# Patient Record
Sex: FEMALE | Race: BLACK OR AFRICAN AMERICAN | Employment: FULL TIME | URBAN - METROPOLITAN AREA
[De-identification: names, ages, dates, MRNs, and addresses within clinical notes are randomized per-mention and may not be internally consistent; named-entity substitution may affect disease eponyms.]

---

## 2017-11-16 ENCOUNTER — HOSPITAL ENCOUNTER (EMERGENCY)
Age: 42
Discharge: HOME | End: 2017-11-16
Attending: EMERGENCY MEDICINE
Payer: SELF-PAY

## 2017-11-16 VITALS
RESPIRATION RATE: 16 BRPM | HEIGHT: 63 IN | HEART RATE: 92 BPM | SYSTOLIC BLOOD PRESSURE: 171 MMHG | TEMPERATURE: 98.5 F | OXYGEN SATURATION: 100 % | BODY MASS INDEX: 48.37 KG/M2 | DIASTOLIC BLOOD PRESSURE: 93 MMHG | WEIGHT: 273 LBS

## 2017-11-16 DIAGNOSIS — I10 ESSENTIAL HYPERTENSION: Primary | ICD-10-CM

## 2017-11-16 DIAGNOSIS — R51.9 NONINTRACTABLE HEADACHE, UNSPECIFIED CHRONICITY PATTERN, UNSPECIFIED HEADACHE TYPE: ICD-10-CM

## 2017-11-16 LAB
ANION GAP SERPL CALC-SCNC: 10 MMOL/L (ref 3–18)
BASOPHILS # BLD: 0 K/UL (ref 0–0.06)
BASOPHILS NFR BLD: 0 % (ref 0–2)
BUN SERPL-MCNC: 6 MG/DL (ref 7–18)
BUN/CREAT SERPL: 8 (ref 12–20)
CALCIUM SERPL-MCNC: 9.5 MG/DL (ref 8.5–10.1)
CHLORIDE SERPL-SCNC: 103 MMOL/L (ref 100–108)
CO2 SERPL-SCNC: 25 MMOL/L (ref 21–32)
CREAT SERPL-MCNC: 0.78 MG/DL (ref 0.6–1.3)
DIFFERENTIAL METHOD BLD: ABNORMAL
EOSINOPHIL # BLD: 0.2 K/UL (ref 0–0.4)
EOSINOPHIL NFR BLD: 3 % (ref 0–5)
ERYTHROCYTE [DISTWIDTH] IN BLOOD BY AUTOMATED COUNT: 15.9 % (ref 11.6–14.5)
GLUCOSE SERPL-MCNC: 105 MG/DL (ref 74–99)
HCT VFR BLD AUTO: 41.1 % (ref 35–45)
HGB BLD-MCNC: 13.5 G/DL (ref 12–16)
LYMPHOCYTES # BLD: 4.3 K/UL (ref 0.9–3.6)
LYMPHOCYTES NFR BLD: 47 % (ref 21–52)
MCH RBC QN AUTO: 25 PG (ref 24–34)
MCHC RBC AUTO-ENTMCNC: 32.8 G/DL (ref 31–37)
MCV RBC AUTO: 76 FL (ref 74–97)
MONOCYTES # BLD: 0.4 K/UL (ref 0.05–1.2)
MONOCYTES NFR BLD: 5 % (ref 3–10)
NEUTS SEG # BLD: 4 K/UL (ref 1.8–8)
NEUTS SEG NFR BLD: 45 % (ref 40–73)
PLATELET # BLD AUTO: 300 K/UL (ref 135–420)
PMV BLD AUTO: 12 FL (ref 9.2–11.8)
POTASSIUM SERPL-SCNC: 4 MMOL/L (ref 3.5–5.5)
RBC # BLD AUTO: 5.41 M/UL (ref 4.2–5.3)
SODIUM SERPL-SCNC: 138 MMOL/L (ref 136–145)
TROPONIN I SERPL-MCNC: <0.02 NG/ML (ref 0–0.04)
WBC # BLD AUTO: 8.9 K/UL (ref 4.6–13.2)

## 2017-11-16 PROCEDURE — 85025 COMPLETE CBC W/AUTO DIFF WBC: CPT

## 2017-11-16 PROCEDURE — 99284 EMERGENCY DEPT VISIT MOD MDM: CPT

## 2017-11-16 PROCEDURE — 84484 ASSAY OF TROPONIN QUANT: CPT

## 2017-11-16 PROCEDURE — 74011250637 HC RX REV CODE- 250/637: Performed by: PHYSICIAN ASSISTANT

## 2017-11-16 PROCEDURE — 93005 ELECTROCARDIOGRAM TRACING: CPT

## 2017-11-16 PROCEDURE — 80048 BASIC METABOLIC PNL TOTAL CA: CPT

## 2017-11-16 RX ORDER — HYDROCHLOROTHIAZIDE 25 MG/1
25 TABLET ORAL
Status: COMPLETED | OUTPATIENT
Start: 2017-11-16 | End: 2017-11-16

## 2017-11-16 RX ORDER — HYDROCHLOROTHIAZIDE 25 MG/1
25 TABLET ORAL DAILY
Qty: 30 TAB | Refills: 0 | Status: SHIPPED | OUTPATIENT
Start: 2017-11-16 | End: 2017-12-16

## 2017-11-16 RX ADMIN — HYDROCHLOROTHIAZIDE 25 MG: 25 TABLET ORAL at 11:02

## 2017-11-16 NOTE — LETTER
[facility] EMERGENCY DEPT 
[facility] [phone] 
[phone] Work/School Note Date: 11/16/2017 To Whom It May concern: 
 
Marleny Walters was seen and treated today in the emergency room by the following provider(s): 
Attending Provider: Shelby Fonseca MD 
Physician Assistant: Clement Sweeney PA-C. Marleny Walters may return to work on 11/17/2017. Sincerely, Clement Sweeney PA-C

## 2017-11-16 NOTE — DISCHARGE INSTRUCTIONS

## 2017-11-16 NOTE — ED PROVIDER NOTES
HPI Comments: Patient is a 41 y/o obese female w/ no PMH who presents to the ER for evaluation of elevated blood pressure readings and recent onset of headache. Patient states Hypertension runs in her family, however she has not been diagnosed with it before. She states she has had a headache x 3 days that has not improved with taking OTC analgesics. Patients pain is 7/10, and states her whole head hurts like a throbbing sensation. She denied any recents falls or trauma to her head. Patient was seen at [facility] today for her 3 month follow up and Depo birth control shot, when they advised her to f/u with the ER due to her blood pressure. Patient was not given her injection because of the same. She denied any recent fevers, chills, neck stiffness, change in vision, SOB, chest pain, palpitations, abd pain, nausea, vomiting, dysuria, and has no there complaints. Patient is a 40 y.o. female presenting with headaches and hypertension. The history is provided by the patient. Headache    Pertinent negatives include no fever, no palpitations, no shortness of breath, no weakness, no dizziness, no nausea and no vomiting. Hypertension    Associated symptoms include headaches. Pertinent negatives include no chest pain, no palpitations, no dizziness, no shortness of breath, no nausea and no vomiting. Past Medical History:   Diagnosis Date    Hypertension        History reviewed. No pertinent surgical history. History reviewed. No pertinent family history. Social History     Social History    Marital status: SINGLE     Spouse name: N/A    Number of children: N/A    Years of education: N/A     Occupational History    Not on file.      Social History Main Topics    Smoking status: Never Smoker    Smokeless tobacco: Never Used    Alcohol use No    Drug use: Not on file    Sexual activity: Not on file     Other Topics Concern    Not on file     Social History Narrative ALLERGIES: [place]    Review of Systems   Constitutional: Negative for chills, fatigue and fever. HENT: Negative. Negative for sore throat. Eyes: Negative. Negative for visual disturbance. Respiratory: Negative for cough and shortness of breath. Cardiovascular: Negative for chest pain and palpitations. Gastrointestinal: Negative for abdominal pain, nausea and vomiting. Genitourinary: Negative for dysuria. Musculoskeletal: Negative. Skin: Negative. Neurological: Positive for headaches. Negative for dizziness, weakness and light-headedness. Psychiatric/Behavioral: Negative. All other systems reviewed and are negative. Vitals:    11/16/17 0949 11/16/17 1010 11/16/17 1105 11/16/17 1236   BP:  (!) 200/118  (!) 171/93   Pulse:   72 92   Resp: 16   16   Temp: 98.5 °F (36.9 °C)      SpO2: 100%      Weight: 123.8 kg (273 lb)      Height: 5' 3\" (1.6 m)               Physical Exam   Constitutional: She is oriented to person, place, and time. She appears well-developed and well-nourished. No distress. HENT:   Head: Normocephalic and atraumatic. Mouth/Throat: Oropharynx is clear and moist.   Eyes: Conjunctivae and EOM are normal. Pupils are equal, round, and reactive to light. No scleral icterus. Neck: Normal range of motion. Neck supple. No JVD present. No muscular tenderness present. No tracheal deviation present. Cardiovascular: Normal rate, regular rhythm and normal heart sounds. Pulmonary/Chest: Effort normal and breath sounds normal. No respiratory distress. She has no wheezes. She has no rales. She exhibits no tenderness. Abdominal: Soft. There is no tenderness. Musculoskeletal: Normal range of motion. Neurological: She is alert and oriented to person, place, and time. She has normal strength. Gait normal. GCS eye subscore is 4. GCS verbal subscore is 5. GCS motor subscore is 6. Skin: Skin is warm and dry. She is not diaphoretic.    Psychiatric: She has a normal mood and affect. Nursing note and vitals reviewed. MDM  Number of Diagnoses or Management Options  Essential hypertension:   Nonintractable headache, unspecified chronicity pattern, unspecified headache type:   Diagnosis management comments: 11:00 AM  39 y/o female c/o HTN and headache. Recent onset of headache x 3 days with no improvement with OTC analgesics. [facility] shot, however did not receive due to BP reading. Paper given to me by patient [facility]. Pt has no PCP and has not followed up with anyone regarding this. 2/11/17:  164/80  5/11/17:  174/101  8/8/17:  160/85  11/6/17:  160/100  11/16/17:  177/97    Patient with no medical insurance. Discussed importance of lifestyle changes, diet modifications. Pt states she has lost 20 pounds in past few months, however admits to eating a lot of fried foods. Will plan on labs, and give first dose HCTZ 25 mg here. Will also plan on close follow up with Neville Sriavstava as PCP for HTN. Arminda Nelson PA-C    12:48 PM  Pt reports relief in her headache after taking HTN med. Feeling better. Discussed DASH DIET, improved lifestyle modifications and close follow up with primary. Will plan on prescription for HCTZ 25 mg every day. Strict return precautions given. All questions answered and patient in agreement with plan of care. Will plan for discharge.   Arminda Nelson PA-C      Clinical Impression:  Essential HTN, headache         Amount and/or Complexity of Data Reviewed  Clinical lab tests: ordered and reviewed  Tests in the radiology section of CPT®: ordered and reviewed    Risk of Complications, Morbidity, and/or Mortality  Presenting problems: moderate  Diagnostic procedures: moderate  Management options: moderate    Patient Progress  Patient progress: stable    ED Course       Procedures Vitals:  Patient Vitals for the past 12 hrs:   Temp Pulse Resp BP SpO2   11/16/17 1236 - 92 16 (!) 171/93 -   11/16/17 1105 - 72 - - -   11/16/17 1010 - - - (!) 200/118 -   11/16/17 0949 98.5 °F (36.9 °C) - 16 - 100 %         Medications ordered:   Medications   hydroCHLOROthiazide (HYDRODIURIL) tablet 25 mg (25 mg Oral Given 11/16/17 1102)         Lab findings:  Recent Results (from the past 12 hour(s))   CBC WITH AUTOMATED DIFF    Collection Time: 11/16/17 10:42 AM   Result Value Ref Range    WBC 8.9 4.6 - 13.2 K/uL    RBC 5.41 (H) 4.20 - 5.30 M/uL    HGB 13.5 12.0 - 16.0 g/dL    HCT 41.1 35.0 - 45.0 %    MCV 76.0 74.0 - 97.0 FL    MCH 25.0 24.0 - 34.0 PG    MCHC 32.8 31.0 - 37.0 g/dL    RDW 15.9 (H) 11.6 - 14.5 %    PLATELET 642 531 - 371 K/uL    MPV 12.0 (H) 9.2 - 11.8 FL    NEUTROPHILS 45 40 - 73 %    LYMPHOCYTES 47 21 - 52 %    MONOCYTES 5 3 - 10 %    EOSINOPHILS 3 0 - 5 %    BASOPHILS 0 0 - 2 %    ABS. NEUTROPHILS 4.0 1.8 - 8.0 K/UL    ABS. LYMPHOCYTES 4.3 (H) 0.9 - 3.6 K/UL    ABS. MONOCYTES 0.4 0.05 - 1.2 K/UL    ABS. EOSINOPHILS 0.2 0.0 - 0.4 K/UL    ABS.  BASOPHILS 0.0 0.0 - 0.06 K/UL    DF AUTOMATED     METABOLIC PANEL, BASIC    Collection Time: 11/16/17 10:42 AM   Result Value Ref Range    Sodium 138 136 - 145 mmol/L    Potassium 4.0 3.5 - 5.5 mmol/L    Chloride 103 100 - 108 mmol/L    CO2 25 21 - 32 mmol/L    Anion gap 10 3.0 - 18 mmol/L    Glucose 105 (H) 74 - 99 mg/dL    BUN 6 (L) 7.0 - 18 MG/DL    Creatinine 0.78 0.6 - 1.3 MG/DL    BUN/Creatinine ratio 8 (L) 12 - 20      GFR est AA >60 >60 ml/min/1.73m2    GFR est non-AA >60 >60 ml/min/1.73m2    Calcium 9.5 8.5 - 10.1 MG/DL   TROPONIN I    Collection Time: 11/16/17 10:42 AM   Result Value Ref Range    Troponin-I, Qt. <0.02 0.0 - 0.045 NG/ML   EKG, 12 LEAD, INITIAL    Collection Time: 11/16/17 10:50 AM   Result Value Ref Range    Ventricular Rate 84 BPM    Atrial Rate 84 BPM    P-R Interval 144 ms    QRS Duration 80 ms    Q-T Interval 392 ms    QTC Calculation (Bezet) 463 ms    Calculated P Axis 44 degrees    Calculated R Axis 7 degrees    Calculated T Axis 79 degrees    Diagnosis       Normal sinus rhythm  Possible Left atrial enlargement  Borderline ECG  When compared with ECG of 31-MAR-2014 22:03,  No significant change was found         EKG interpretation by ED Physician:  NSR rate 84 w/ no acute ST/T wave abnormalities    X-Ray, CT or other radiology findings or impressions:  No orders to display       Progress notes, Consult notes or additional Procedure notes:       Reevaluation of patient:       Disposition:  Diagnosis:   1. Essential hypertension    2. Nonintractable headache, unspecified chronicity pattern, unspecified headache type        Disposition:     Follow-up Information     Follow up With Details Comments Contact [facility] EMERGENCY DEPT  If symptoms worsen [address] [place] [address] [place] Call today to establish PCP and follow up for new hypertension diagnosis [address] [place] [address] Drive  [phone]           Patient's Medications   Start Taking    HYDROCHLOROTHIAZIDE (HYDRODIURIL) 25 MG TABLET    Take 1 Tab by mouth daily for 30 days. Continue Taking    No medications on file   These Medications have changed    No medications on file   Stop Taking    GUAIFENESIN-CODEINE (ROBITUSSIN AC)  MG/5 ML SOLUTION    Take 5 mL by mouth three (3) times daily as needed for Cough. OMEPRAZOLE (PRILOSEC) 20 MG CAPSULE    Take 1 Cap by mouth daily.

## 2017-11-16 NOTE — Clinical Note
Return to ER if worsening symptoms; take HTN med daily as prescribed and follow up with info below to establish Primary care

## 2017-11-17 LAB
ATRIAL RATE: 84 BPM
CALCULATED P AXIS, ECG09: 44 DEGREES
CALCULATED R AXIS, ECG10: 7 DEGREES
CALCULATED T AXIS, ECG11: 79 DEGREES
DIAGNOSIS, 93000: NORMAL
P-R INTERVAL, ECG05: 144 MS
Q-T INTERVAL, ECG07: 392 MS
QRS DURATION, ECG06: 80 MS
QTC CALCULATION (BEZET), ECG08: 463 MS
VENTRICULAR RATE, ECG03: 84 BPM